# Patient Record
Sex: FEMALE | Race: BLACK OR AFRICAN AMERICAN | NOT HISPANIC OR LATINO | URBAN - METROPOLITAN AREA
[De-identification: names, ages, dates, MRNs, and addresses within clinical notes are randomized per-mention and may not be internally consistent; named-entity substitution may affect disease eponyms.]

---

## 2022-04-30 ENCOUNTER — TELEPHONE ENCOUNTER (OUTPATIENT)
Dept: URBAN - METROPOLITAN AREA CLINIC 121 | Facility: CLINIC | Age: 62
End: 2022-04-30

## 2022-05-01 ENCOUNTER — TELEPHONE ENCOUNTER (OUTPATIENT)
Dept: URBAN - METROPOLITAN AREA CLINIC 121 | Facility: CLINIC | Age: 62
End: 2022-05-01

## 2022-05-01 RX ORDER — LANOLIN AND PETROLATUM .01; .97 G/G; G/G
DAILY OINTMENT TOPICAL
Status: ACTIVE | COMMUNITY
Start: 2013-07-17

## 2022-08-15 ENCOUNTER — DASHBOARD ENCOUNTERS (OUTPATIENT)
Age: 62
End: 2022-08-15

## 2022-08-15 ENCOUNTER — WEB ENCOUNTER (OUTPATIENT)
Dept: URBAN - METROPOLITAN AREA CLINIC 27 | Facility: CLINIC | Age: 62
End: 2022-08-15

## 2022-08-15 ENCOUNTER — OFFICE VISIT (OUTPATIENT)
Dept: URBAN - METROPOLITAN AREA CLINIC 27 | Facility: CLINIC | Age: 62
End: 2022-08-15
Payer: COMMERCIAL

## 2022-08-15 VITALS — BODY MASS INDEX: 40.97 KG/M2 | TEMPERATURE: 96.9 F | HEIGHT: 64 IN | WEIGHT: 240 LBS

## 2022-08-15 DIAGNOSIS — K92.1 HEMATOCHEZIA: ICD-10-CM

## 2022-08-15 DIAGNOSIS — Z12.11 ENCOUNTER FOR SCREENING FOR MALIGNANT NEOPLASM OF COLON: ICD-10-CM

## 2022-08-15 PROCEDURE — 99203 OFFICE O/P NEW LOW 30 MIN: CPT | Performed by: INTERNAL MEDICINE

## 2022-08-15 PROCEDURE — 99244 OFF/OP CNSLTJ NEW/EST MOD 40: CPT | Performed by: INTERNAL MEDICINE

## 2022-08-15 RX ORDER — LANOLIN AND PETROLATUM .01; .97 G/G; G/G
DAILY OINTMENT TOPICAL
Status: DISCONTINUED | COMMUNITY
Start: 2013-07-17

## 2022-08-15 NOTE — HPI-TODAY'S VISIT:
A this is a 62-year-old female seen as a new patient consultation today for on and off rectal bleeding.  She takes probiotics.  She states she ate a whole bunch of carrots and saw large stool then saw red in the toilet back in July.  She has not had no bleeding in the last couple of weeks.  No pain at that time she was concerned about the bleeding.  She had a colonoscopy in 2013 and was told to repeat in 2 years due to prep.  No weight loss no abdominal pain. No straining.

## 2022-08-16 ENCOUNTER — LAB OUTSIDE AN ENCOUNTER (OUTPATIENT)
Dept: URBAN - METROPOLITAN AREA CLINIC 27 | Facility: CLINIC | Age: 62
End: 2022-08-16

## 2022-08-16 PROBLEM — 405729008 HEMATOCHEZIA: Status: ACTIVE | Noted: 2022-08-16

## 2022-10-07 ENCOUNTER — OFFICE VISIT (OUTPATIENT)
Dept: URBAN - METROPOLITAN AREA SURGERY CENTER 7 | Facility: SURGERY CENTER | Age: 62
End: 2022-10-07

## 2023-07-19 ENCOUNTER — TELEPHONE ENCOUNTER (OUTPATIENT)
Dept: URBAN - METROPOLITAN AREA CLINIC 27 | Facility: CLINIC | Age: 63
End: 2023-07-19